# Patient Record
Sex: FEMALE | Race: WHITE | NOT HISPANIC OR LATINO | Employment: UNEMPLOYED
[De-identification: names, ages, dates, MRNs, and addresses within clinical notes are randomized per-mention and may not be internally consistent; named-entity substitution may affect disease eponyms.]

---

## 2017-07-29 ENCOUNTER — HEALTH MAINTENANCE LETTER (OUTPATIENT)
Age: 32
End: 2017-07-29

## 2022-05-28 ENCOUNTER — HOSPITAL ENCOUNTER (EMERGENCY)
Facility: CLINIC | Age: 37
Discharge: HOME | End: 2022-05-28
Attending: EMERGENCY MEDICINE | Admitting: EMERGENCY MEDICINE
Payer: COMMERCIAL

## 2022-05-28 ENCOUNTER — TELEPHONE (OUTPATIENT)
Dept: BEHAVIORAL HEALTH | Facility: CLINIC | Age: 37
End: 2022-05-28
Payer: COMMERCIAL

## 2022-05-28 VITALS
SYSTOLIC BLOOD PRESSURE: 138 MMHG | DIASTOLIC BLOOD PRESSURE: 100 MMHG | OXYGEN SATURATION: 100 % | TEMPERATURE: 97.7 F | RESPIRATION RATE: 16 BRPM | HEART RATE: 85 BPM

## 2022-05-28 DIAGNOSIS — Z72.89 SELF-INJURIOUS BEHAVIOR: ICD-10-CM

## 2022-05-28 DIAGNOSIS — S41.112A LACERATION OF LEFT UPPER ARM, INITIAL ENCOUNTER: ICD-10-CM

## 2022-05-28 PROCEDURE — 90791 PSYCH DIAGNOSTIC EVALUATION: CPT

## 2022-05-28 PROCEDURE — 12005 RPR S/N/A/GEN/TRK12.6-20.0CM: CPT

## 2022-05-28 PROCEDURE — 99285 EMERGENCY DEPT VISIT HI MDM: CPT | Mod: 25

## 2022-05-28 RX ORDER — LIDOCAINE HYDROCHLORIDE AND EPINEPHRINE 10; 10 MG/ML; UG/ML
INJECTION, SOLUTION INFILTRATION; PERINEURAL
Status: DISCONTINUED
Start: 2022-05-28 | End: 2022-05-28 | Stop reason: HOSPADM

## 2022-05-28 ASSESSMENT — ENCOUNTER SYMPTOMS
NUMBNESS: 0
WOUND: 1

## 2022-05-28 NOTE — CONSULTS
5/28/2022  Ryanne Hill 1985     [facility] Crisis Assessment    Patient was assessed: remote  Patient location: ED[address]  Was a release of information signed: Yes. Providers included on the release: referred to and [facility]    Referral Data and Chief Complaint  Ryanne is a 37 year old who uses she/her pronouns. Patient presented to the ED alone and was referred to the ED by self. Patient is presenting to the ED for the following concerns: self harm via cutting.      Informed Consent and Assessment Methods    Patient is her own guardian. Writer met with patient and explained the crisis assessment process, including applicable information disclosures and limits to confidentiality, assessed understanding of the process, and obtained consent to proceed with the assessment. Patient was observed to be able to participate in the assessment as evidenced by responding to questions in a coherent manner and indicating treatment  preferences. Assessment methods included conducting a formal interview with patient, review of medical records, collaboration with medical staff, and obtaining relevant collateral information from family and community providers when available.    Narrative Summary of Presenting Problem and Current Functioning  What led to the patient presenting for crisis services, factors that make the crisis life threatening or complex, stressors, how is this disrupting the patient's life, and how current functioning is in comparison to baseline. How is patient presenting during the assessment.     Pt is open, cooperative. States at beginning after I introduce self and purpose that she would prefer to not be hospitalized. Wants resources though.  Came in for cuts on arm - self harm. Reports to me with some self-deprecation - she is a , she started dating a , and stepped down to a lower position about a week ago so it was ok at work. Then, the camilla broke up with her last night.   She is mad at  "herself for putting a camilla above her self. She loved her job. She has been struggling with mental health for a while. Denies suicidal plan or intent - reason for living is her kids. Her plan right now - \"just keep on going\". Go to work, be as professional as possible.        History of the Crisis  Duration of the current crisis, coping skills attempted to reduce the crisis, community resources used, and past presentations.    Hx of schizoaffective d/o bipolar type.  Hospitalization 13/14 years ago.   Suicide attempt >15 years ago \"I put a dagger in my side\"    Therapy and meds in past but not for >5 years. Was on paxil, seroquel, lithium, ambien, - some of it helped/some didn't  Moved from [place] to [place] two years ago, and saw a med provider through [facility] once two months ago - but he didn't really listen. Has been working 80 hours and can't get in again soon - dropped the ball she says.    Lives with sister. Kids live with her exhusband, they  a couple of years ago and apparently he had the finances and therefore the kids. Still in contact with them though.   Hx of alcohol concerns. Denies recent    Collateral Information  Limited info available.    Risk Assessment    Risk of Harm to Self     ESS-6  1.a. Over the past 2 weeks, have you had thoughts of killing yourself? Yes  1.b. Have you ever attempted to kill yourself and, if yes, when did this last happen? Yes >15 years ago   2. Recent or current suicide plan? No   3. Recent or current intent to act on ideation? No  4. Lifetime psychiatric hospitalization? Yes  5. Pattern of excessive substance use? Yes  6. Current irritability, agitation, or aggression? No  Scoring note: BOTH 1a and 1b must be yes for it to score 1 point, if both are not yes it is zero. All others are 1 point per number. If all questions 1a/1b - 6 are no, risk is negligible. If one of 1a/1b is yes, then risk is mild. If either question 2 or 3, but not both, is yes, then risk is " automatically moderate regardless of total score. If both 2 and 3 are yes, risk is automatically high regardless of total score.     Score: 3, moderate risk    The patient has the following risk factors for suicide: substance abuse, poor impulse control, prior suicide attempt, recent loss and family disruption    Is the patient experiencing current suicidal ideation: No    Is the patient engaging in preparatory suicide behaviors (formulating how to act on plan, giving away possessions, saying goodbye, displaying dramatic behavior changes, etc)? No    Does the patient have access to firearms or other lethal means? no    The patient has the following protective factors: social support, expresses desire to engage in treatment, sense of obligation to people/pets and fulfilling employment    Support system information: sister is her only person in the world. Also reports her boss/work is aware and supportive re: her mental health    Patient strengths: Just keeps going. Will push through this    Does the patient engage in non-suicidal self-injurious behavior (NSSI/SIB)? yes. Method:cutting Frequency:last was several mos ago Duration:last night- was over the course of an argument History: since early adulthood    Is the patient vulnerable to sexual exploitation?  No    Is the patient experiencing abuse or neglect? no    Is the patient a vulnerable adult? No      Risk of Harm to Others  The patient has the following risk factors of harm to others: no risk factors identified    Does the patient have thoughts of harming others? No    Is the patient engaging in sexually inappropriate behavior?  no       Current Substance Abuse    Is there recent substance abuse? endorsed alcohol use last night. Did not specify amount    Was a urine drug screen or blood alcohol level obtained: No    CAGE AID  Reported she was in court ordered alcohol tx in 2016 - nothing thereafter. Denies substance use now. Denies concerns with alcohol  use      Current Symptoms/Concerns    Symptoms  Attention, hyperactivity, and impulsivity symptoms present: No    Anxiety symptoms present: No      Appetite symptoms present: No     Behavioral difficulties present: No     Cognitive impairment symptoms present: No    Depressive symptoms present: Yes Depressed mood, Feelings of helplessness , Feelings of hopelessness , Feelings of worthlessness , Impaired decision making , Low self esteem  and Thoughts of suicide/death      Eating disorder symptoms present: No    Learning disabilities, cognitive challenges, and/or developmental disorder symptoms present: No     Manic/hypomanic symptoms present: No    Personality and interpersonal functioning difficulties present : Yes: Emotional Deregulation and Impaired Interpersonal Functioning    Psychosis symptoms present: No      Sleep difficulties present: Yes: Difficulty falling asleep  and Difficulty staying sleep     Substance abuse disorder symptoms present: No     Trauma and stressor related symptoms present: No       Mental Status Exam   Affect: Appropriate   Appearance: Appropriate    Attention Span/Concentration: Attentive?    Eye Contact: Variable   Fund of Knowledge: Appropriate    Language /Speech Content: Fluent   Language /Speech Volume: Normal    Language /Speech Rate/Productions: Normal    Recent Memory: Intact   Remote Memory: Intact   Mood: Sad    Orientation to Person: Yes    Orientation to Place: Yes   Orientation to Time of Day: Yes    Orientation to Date: Yes    Situation (Do they understand why they are here?): Yes    Psychomotor Behavior: Normal    Thought Content: Clear   Thought Form: Intact       Mental Health and Substance Abuse History    History  Current and historical diagnoses or mental health concerns: schizoaffective bipolar type    Prior MH services (inpatient, programmatic care, outpatient, etc) : Yes hospitalization a long time ago, occasional therapy. no other LOC       Has the patient used  [facility] crisis team services before?: No    History of substance abuse: Yes alcohol    Prior CARLOS services (inpatient, programmatic care, detox, outpatient, etc) : Yes court ordered 2016    History of commitment: No    Family history of MH/CARLOS: No    Trauma history: No    Medication  Psychotropic medications: No current medications but a history of meds as noted in narrative.    Current Care Team  Primary Care Provider: No - would see [facility]    Psychiatrist: No    Therapist: No    : No    CTSS or ARMHS: No    ACT Team: No    Other: No    Biopsychosocial Information    Socioeconomic Information  Current living situation: sister and sister's son    Employment/income source: working 80 hours as  and     Relevant legal issues: denies    Cultural, Amish, or spiritual influences on mental health care: does not endorse    Is the patient active in the  or a : No      Relevant Medical Concerns   Patient identifies concerns with completing ADLs? No     Patient can ambulate independently? Yes     Other medical concerns? No     History of concussion or TBI? No        Diagnosis    295.70  (F25) Schizoaffective Disorder Bipolar Type - by history           Therapeutic Intervention  The following therapeutic methodologies were employed when working with the patient: establishing rapport, active listening, solution focused brief therapy, safety planning, motivational interviewing, trauma informed care and treatment planning. Patient response to intervention: rates it as supportive.      Disposition  Recommended disposition: Programmatic Care: day tx or iop given sx reported      Reviewed case and recommendations with attending provider. Attending Name: Juvenal      Attending concurs with disposition: Yes      Patient concurs with disposition: No: is interested in med management and therapy but has to keep working a lot - so we talk about OP resources      Guardian concurs  "with disposition: NA     Final disposition: Individual therapy  and Medication management.         Clinical Substantiation of Recommendations   Rationale with supporting factors for disposition and diagnosis.     Pt came to ED herself due to cutting in context of relationship breakup and job change. It certainly sounds stressful. This is occurring amongst a backdrop of poor mental health - hx, remotely, of suicide attempt, and more recently- cutting, low mood, \"not taking care of self\". Limited engagement in MH services due to barriers and work. Pt is able to safety plan and wants to work tonight. Denies SI. Reports some support system, and kids are important to her. She is referred to the level of care she feels she can engage in at this time - outpatient therapy and med management. Higher LOC is still encouraged if sx persist but she would like to start there. Something>nothing in absence of acute imminent risk to self/others, which she denies.        Assessment Details  Patient interview started at: 8:50a and completed at: 9:20a.    Total duration spent on the patient case in minutes: 1.0 hrs     CPT code(s) utilized: 96261 - Psychotherapy for Crisis - 60 (30-74*) min       Aftercare and Safety Planning  Follow up plans with MH/CARLOS services: Yes see below      Aftercare plan placed in the AVS and provided to patient: Yes. Given to patient by ED staff    ROQUE Hernandez      Aftercare Plan  Thank you for your time and bravery today. You met with Vicki, and below are notes of the follow up items you two discussed:    Follow up Appointments for Mental Health:  As discussed with Vicki, you have been referred to the [facility] Transition [facility]. Our staff will contact you to schedule. This outpatient service will provide short-term, VIRTUAL sessions until you begin scheduled services with your long-term provider(s). If you need to contact the Transition Clinic, please call [phone].    Longer-term " therapy appointment:  Thursday 6/16/2022 9:00 AM For Telehealth  Johnathan Harmon [facility], [facility]  [phone]  Please complete paperwork at least 48 hours before your appointment - available on website  [email or web address]       Longer-term medication management provider appointment:  6/29/2022 9:00 AM Telehealth  [facility]  [address]  [place] [address]  [phone]   Note from provider: You will be contacted by our office to set up the virtual meeting. If you have questions, please contact our office    If I am feeling unsafe or I am in a crisis, I will:   Contact my established care providers   Call the  [facility] Crisis  [phone]  Go to the nearest emergency room   Call 111     My plan today: go to work and be as professional as possible. My team supports me there. I will be kind to myself and try to not put too much pressure on myself today - I've had a really hard day and  Need rest and recovery like anyone else.   My sister supports me. I am important to my kids. I will keep pushing on this - I am worth taking care of.    Crisis Lines  Crisis Text Line  Text [phone]  You will be connected with a trained live crisis counselor to provide support.      Additional Information  Today you were seen by a licensed mental health professional through Triage and Transition services, Behavioral Healthcare Providers ([facility])  for a crisis assessment in the Emergency Department at [facility].  It is recommended that you follow up with your established providers (psychiatrist, mental health therapist, and/or primary care doctor - as relevant) as soon as possible. Coordinators from [facility] will be calling you in the next 24-48 hours to ensure that you have the resources you need.  You can also contact [facility] coordinators directly at [phone]. You may have been scheduled for or offered an appointment with a mental health provider.  [facility] maintains an extensive network of licensed behavioral health providers to connect patients with the services they need.  We do not charge providers a fee to participate in our referral network.  We match patients with providers based on a patient's specific needs, insurance coverage, and location.  Our first effort will be to refer you to a provider within your care system, and will utilize providers outside your care system as needed.

## 2022-05-28 NOTE — DISCHARGE INSTRUCTIONS
Aftercare Plan  Thank you for your time and bravery today. You met with Vicki, and below are notes of the follow up items you two discussed:    Follow up Appointments for Mental Health:  As discussed with Vicki, you have been referred to the [facility] Transition [facility]. Our staff will contact you to schedule. This outpatient service will provide short-term, VIRTUAL sessions until you begin scheduled services with your long-term provider(s). If you need to contact the Transition Clinic, please call [phone].    Longer-term therapy appointment:  Thursday 6/16/2022 9:00 AM For Telehealth  Johnathan Harmon [facility], [facility]  [phone]  Please complete paperwork at least 48 hours before your appointment - available on website  [email or web address]       Longer-term medication management provider appointment:  6/29/2022 9:00 AM Telehealth  [facility]  [address] [place] [address]  [phone]   Note from provider: You will be contacted by our office to set up the virtual meeting. If you have questions, please contact our office    If I am feeling unsafe or I am in a crisis, I will:   Contact my established care providers   Call the  [facility] Crisis  [phone]  Go to the nearest emergency room   Call [phone]     My plan today: go to work and be as professional as possible. My team supports me there. I will be kind to myself and try to not put too much pressure on myself today - I've had a really hard day and  Need rest and recovery like anyone else.   My sister supports me. I am important to my kids. I will keep pushing on this - I am worth taking care of.    Crisis Lines  Crisis Text Line  Text [phone]  You will be connected with a trained live crisis counselor to provide support.      Additional Information  Today you were seen by a licensed mental health professional through Triage and Transition services, Behavioral  Healthcare Providers ([facility])  for a crisis assessment in the Emergency Department at [facility].  It is recommended that you follow up with your established providers (psychiatrist, mental health therapist, and/or primary care doctor - as relevant) as soon as possible. Coordinators from [facility] will be calling you in the next 24-48 hours to ensure that you have the resources you need.  You can also contact [facility] coordinators directly at [phone]. You may have been scheduled for or offered an appointment with a mental health provider. [facility] maintains an extensive network of licensed behavioral health providers to connect patients with the services they need.  We do not charge providers a fee to participate in our referral network.  We match patients with providers based on a patient's specific needs, insurance coverage, and location.  Our first effort will be to refer you to a provider within your care system, and will utilize providers outside your care system as needed.        Discharge Instructions  Laceration (Cut)    You were seen today for a laceration (cut).  Your provider examined your laceration for any problems such a buried foreign body (like glass, a splinter, or gravel), or injury to blood vessels, tendons, and nerves.  Your provider may have also rinsed and/or scrubbed your laceration to help prevent an infection. It may not be possible to find all problems with your laceration on the first visit; occasionally foreign bodies or a tendon injury can go undetected.    Your laceration may have been closed in one of several ways:  No closure: many wounds will heal just fine without closure.  Stitches: regular stitches that require removal.  Staples: skin staples are often used in the scalp/head.  Wound adhesive (glue): skin glue can be used for certain lacerations and doesn t require removal.  Wound strips (aka Butterfly bandages or steri-strips): these are bandages that help to close a wound.  Absorbable  stitches:  dissolving  stitches that go away on their own and usually don t require removal.    A small percentage of wounds will develop an infection regardless of how well the wound is cared for. Antibiotics are generally not indicated to prevent an infection so are only given for a small number of high-risk wounds. Some lacerations are too high risk to close, and are left open to heal because closure can increase the likelihood that an infection will develop.    Remember that all lacerations, no matter how expertly repaired, will cause scarring. We consider many factors, techniques, and materials, in our efforts to provide the best possible cosmetic outcome.    Generally, every Emergency Department visit should have a follow-up clinic visit with either a primary or a specialty clinic/provider. Please follow-up as instructed by your emergency provider today.     Return to the Emergency Department right away if:  You have more redness, swelling, pain, drainage (pus), a bad smell, or red streaking from your laceration as these symptoms could indicate an infection.  You have a fever of 100.4 F or more.  You have bleeding that you cannot stop at home. If your cut starts to bleed, hold pressure on the bleeding area with a clean cloth or put pressure over the bandage.  If the bleeding does not stop after using constant pressure for 30 minutes, you should return to the Emergency Department for further treatment.  An area past the laceration is cool, pale, or blue compared with the other side, or has a slower return of color when squeezed.  Your dressing seems too tight or starts to get uncomfortable or painful. For children, signs of a problem might be irritability or restlessness.  You have loss of normal function or use of an area, such as being unable to straighten or bend a finger normally.  You have a numb area past the laceration.    Return to the Emergency Department or see your regular provider if:  The  laceration starts to come open.   You have something coming out of the cut or a feeling that there is something in the laceration.  Your wound will not heal, or keeps breaking open. There can always be glass, wood, dirt or other things in any wound.  They will not always show up, even on x-rays.  If a wound does not heal, this may be why, and it is important to follow-up with your regular provider.    Home Care:  Take your dressing off in 12-24 hours, or as instructed by your provider, to check your laceration. Remove the dressing sooner if it seems too tight or painful, or if it is getting numb, tingly, or pale past the dressing.  Gently wash your laceration 1-2 times daily with clean water and mild soap. It is okay to shower or run clean water over the laceration, but do not let the laceration soak in water (no swimming).  If your laceration was closed with wound adhesive or strips: pat it dry and leave it open to the air. For all other repairs: after you wash your laceration, or at least 2 times a day, apply antibiotic ointment (such as Neosporin  or Bacitracin ) to the laceration, then cover it with a Band-Aid  or gauze.  Keep the laceration clean. Wear gloves or other protective clothing if you are around dirt.    Follow-up for removal:  If your wound was closed with staples or regular stitches, they need to be removed according to the instructions and timeline specified by your provider today.  If your wound was closed with absorbable ( dissolving ) sutures, they should fall out, dissolve, or not be visible in about one week. If they are still visible, then they should be removed according to the instructions and timeline specified by your provider today.    Scars:  To help minimize scarring:  Wear sunscreen over the healed laceration when out in the sun.  Massage the area regularly once healed.  You may apply Vitamin E to the healed wound.  Wait. Scars improve in appearance over months and years.    If you  were given a prescription for medicine here today, be sure to read all of the information (including the package insert) that comes with your prescription.  This will include important information about the medicine, its side effects, and any warnings that you need to know about.  The pharmacist who fills the prescription can provide more information and answer questions you may have about the medicine.  If you have questions or concerns that the pharmacist cannot address, please call or return to the Emergency Department.       Remember that you can always come back to the Emergency Department if you are not able to see your regular provider in the amount of time listed above, if you get any new symptoms, or if there is anything that worries you.

## 2022-05-28 NOTE — LETTER
May 28, 2022      To Whom It May Concern:      Ryanne Hill was seen in our Emergency Department today, 05/28/22.  I expect her condition to improve over the next 1-2 days.  She may return to work when improved.    Sincerely,        Enedelia CRANE RN

## 2022-05-28 NOTE — ED NOTES
Patient forgot phone  after discharge.  RN contacted and informed patient.   placed in patient belonging bag in patient locker #1 in Room 5.

## 2022-05-28 NOTE — ED NOTES
Patient searched by RN and security.  Belongings secured in DEC office.  ERNA explained to patient and written rights given to patient.

## 2022-05-28 NOTE — ED PROVIDER NOTES
History   Chief Complaint:  Laceration     The history is provided by the patient.      Ryanne Hill is a 37 year old female with history of bipolar disorder and self harm who presents with left arm laceration. The patient reports lacerating the elbow region of her left arm with a clean kitchen knife at approximately 2:30am. At the time of injury, she states she was under the influence of alcohol and was extremely angry, but denies any suicidal thoughts. Her children are very important to her and a reason for her to live. She notes that, in the past, she has found relief from stress through the form of cutting. She currently does not have a psychiatric provider, but has had one in the past. She is not on any psychiatric medications, but would like to be. Her last tetanus shot was about 5 years ago. She denies any numbness on her left arm.     Review of Systems   Skin: Positive for wound.   Neurological: Negative for numbness.   Psychiatric/Behavioral: Positive for self-injury. Negative for suicidal ideas.   All other systems reviewed and are negative.    Allergies:  Compazine    Medications:  Paxil    Past Medical History:     Asthma  Bipolar disorder  Pulmonary embolus    Past Surgical History:    D&C    Social History:  Presents alone  Presents via private vehicle    Physical Exam     Patient Vitals for the past 24 hrs:   BP Temp Temp src Pulse Resp SpO2   05/28/22 1007 (!) 138/100 -- -- 85 16 100 %   05/28/22 0814 (!) 139/90 -- -- 81 16 100 %   05/28/22 0624 (!) 158/111 97.7  F (36.5  C) Temporal 100 16 98 %       Physical Exam  VS: Reviewed per above  HENT: normal speech  EYES: sclera anicteric  CV: Rate as noted, regular rhythm.   RESP: Effort normal. Breath sounds are normal bilaterally.  NEURO: Alert, moving all extremities, intact strength and sensation light touch in left upper extremity.  MSK: No deformity of the extremities, 3 horizontally oriented lacerations about the left antecubital fossa measuring  3.5 cm, 4.5 cm, 7.5 cm.  Intact FDP/FDS/extensor tendon function in the left upper extremity distally.  Intact left radial pulse.  SKIN: Warm and dry        Emergency Department Course     Procedures    Laceration Repair #1     Procedure: Laceration Repair    Indication: Laceration    Consent: Verbal    Location: Left Forearm     Length: 3.5 cm    Preparation: Irrigation with Sterile Saline.    Anesthesia: Lidocaine with Epinephrine - 1%   Anxiolysis: None  Sedation: No sedation.      Treatment/Exploration: Wound explored, no foreign bodies found     Closure: The wound was closed with one layer. Skin/superficial layer was closed with 5 x 3-0 Nylon using Interrupted sutures.     Patient Status: The patient tolerated the procedure well: Yes. There were no complications.      Laceration Repair #2     Procedure: Laceration Repair    Indication: Laceration    Consent: Verbal    Location: Left Forearm     Length: 4.5 cm    Preparation: Irrigation with Sterile Saline.    Anesthesia: Lidocaine with Epinephrine - 1%   Anxiolysis: None  Sedation: No sedation.      Treatment/Exploration: Wound explored, no foreign bodies found     Closure: The wound was closed with one layer. Skin/superficial layer was closed with 7 x 3-0 Nylon using Interrupted sutures.     Patient Status: The patient tolerated the procedure well: Yes. There were no complications.      Laceration Repair #3     Procedure: Laceration Repair    Indication: Laceration    Consent: Verbal    Location: Left Forearm     Length: 7.5 cm    Preparation: Irrigation with Sterile Saline.    Anesthesia: Lidocaine with Epinephrine - 1%   Anxiolysis: None  Sedation: No sedation.      Treatment/Exploration: Wound explored, no foreign bodies found     Closure: The wound was closed with one layer. Skin/superficial layer was closed with 11 x 3-0 Nylon using Interrupted sutures.     Patient Status: The patient tolerated the procedure well: Yes. There were no  complications.    Emergency Department Course:           Reviewed:  I reviewed nursing notes, vitals, past medical history and Care Everywhere    Assessments:  0725 I obtained history and examined the patient as noted above.   0744    I numbed the patient's laceration.  0800    Repaired lac as above.  0958 I rechecked the patient and explained findings. Can discharge.    Consults:  0949 I spoke to DEC regarding this patient and their evaluation.    Disposition:  The patient was discharged to home.     Impression & Plan   Medical Decision Making:  Patient presents to the ER for evaluation of self-inflicted left antecubital fossa region lacerations.  On arrival vital signs are reassuring.  On exam she has lacerations extending through the adipose tissue but not involving deeper vascular or tendon or nerve structures on my exam.  No foreign bodies identified.  Lacerations cleansed and irrigated copiously.  They were repaired per procedure note above.  Patient assures me her last tetanus was within 5 years.  Patient mentions her motivation for this was stress release rather than suicide intent.  She cites multiple reasons why she would like to live.  DEC evaluated the patient and patient was able to contract for safety.  DEC was able to arrange for outpatient mental health follow-up.  Plan for suture removal in 2 weeks.  Return precautions discussed prior to discharge.    Diagnosis:    ICD-10-CM    1. Self-injurious behavior  Z72.89    2. Laceration of left upper arm, initial encounter  S41.112A      Scribe Disclosure:  RAMAKRISHNA, Renny Cruz & Fátima Santillan, am serving as a scribe at 7:11 AM on 5/28/2022 to document services personally performed by London Del Rosario MD based on my observations and the provider's statements to me.            London Del Rosario MD  05/28/22 1271
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
## 2022-05-28 NOTE — TELEPHONE ENCOUNTER
Writer reached Pt. Patient was scheduled with TC for 6.2 at 10am with Marsha. Referral notified, added in tracker.  Routed to TC RN pool.    Writer sent mychart activation text. Please check to see if this was activated and then send intake documentation.      Charley Bustos  Care Coordinator  5.28  ----- Message from ROQUE Hernandez sent at 5/28/2022  9:49 AM CDT -----  Regarding: Pt referral  Transition Clinic Referral   [place] Only   Limited [place] Availability    Type of Referral:      _____Therapy  ___X_Therapy & Medication (Psychiatry next level of care appointment needs to be scheduled)  _____Medication Only (Psychiatry next level of care appointment needs to be scheduled)  _____Diagnostic Assessment Only      Referring Provider Name: ROQUE Hernandez    Clinician completing the assessment. Vicki Alvarez    Referring Provider: TRIAGE & TRANSITION ([facility]) CLINICIAN     If known, referring provider contact name: Vicki; Phone Number: [phone]  Service Line/Location: [facility]    Reason for Transition Clinic Referral: bridge gap in care    Next Level of Care Patient Will Be Transitioned To: outpatient services    Start Date for Next Level of Care Therapy (Required): Thursday 6/16/2022 9:00 AM For Telehealth  Johnathan Harmon [facility], [facility]  [phone]      Start Date for Next Level of Care Medication (Required): 6/29/2022 9:00 AM Telehealth  Amadeo Martinez  [facility]  [address] [place] [address]  [phone]      Psychiatry cannot see patients who do not have active medical insurance    What Would Be Helpful from the Transition Clinic: relatonship ending and managing that/work stress are current concern. Hx of mental health and alcohol concerns.      Needs: NO    Does Patient Have Access to Technology: yes    Patient E-mail Address: [email or web address]    Current Patient Phone Number: [phone];      Clinician Gender Preference (if applicable): YES: female    Pt is off of work on Wednesday and Thursdays so ideally will do visit then, she says.    Vicki Alvarez, MARIAHSW

## 2022-05-31 ENCOUNTER — TELEPHONE (OUTPATIENT)
Dept: BEHAVIORAL HEALTH | Facility: CLINIC | Age: 37
End: 2022-05-31

## 2022-05-31 NOTE — TELEPHONE ENCOUNTER
Mental Health &Addiction (MH&A)Transition Clinic (TC):     Provides Patient Support While Waiting to Access Programmatic and Outpatient MH&A Care and Provides Select Crisis Assessment Services     NURSING Referral Review  _________________________________________    This RN has reviewed this Medication Management referral to the Transition Clinic and deemed the referral   [x] Appropriate  [] Inappropriate   []Consulting     Based on the following criteria:     Pt has a psychiatric provider (or pending plan) in place for future prescribing: Yes:  6/29/2022 9:00 AM Telehealth  [facility]  [address]  [place], [place] [address] [phone]      Timeframe until pt's scheduled psychiatry appointment is less than 6 months: Yes:  Approximately 1 month.      Pt takes psychiatric medications: Yes: None listed.      Pt's goals seem to align with this temporary service: Yes: TC to bridge Psychiatric care and psychiatric medication management until next LOC.       Any additional pertinent information regarding this referral: Hx of Bipolar illness.  Recent ED visit 5/28/22 with SIB.  Cut left arm.  Etoh.      Initial contact w/ patient/parent: Wtr made 1st attempt to contact this patient to schedule appointment with the TC.  There was no answer.  Voice message left for this patient. Awaiting call back.      The Transition Clinic phone # is [phone].    Roel St RN on May 31, 2022 at 8:23 AM  Writer reached Pt. Patient was scheduled with TC for 6.2 at 10am with Marsha. Referral notified, added in tracker.  Routed to TC RN pool.     Writer sent [employer] activation text. Please check to see if this was activated and then send intake documentation.        Charley Bustos  Care Coordinator  5.28  ----- Message from ROQUE Hernandez sent at 5/28/2022  9:49 AM CDT -----  Regarding: Pt referral  Transition Clinic Referral   [place] Only   Limited [place] Availability     Type  of Referral:        _____Therapy  ___X_Therapy & Medication (Psychiatry next level of care appointment needs to be scheduled)  _____Medication Only (Psychiatry next level of care appointment needs to be scheduled)  _____Diagnostic Assessment Only        Referring Provider Name: ROQUE Hernandez     Clinician completing the assessment. Vicki Alvarez     Referring Provider: TRIAGE & TRANSITION ([facility]) CLINICIAN      If known, referring provider contact name: Vicki; Phone Number: [phone]  Service Line/Location: [facility]     Reason for Transition Clinic Referral: bridge gap in care     Next Level of Care Patient Will Be Transitioned To: outpatient services     Start Date for Next Level of Care Therapy (Required): Thursday 6/16/2022 9:00 AM For Telehealth  Johnathan Harmon [facility], [facility]  [phone]        Start Date for Next Level of Care Medication (Required): 6/29/2022 9:00 AM Telehealth  [facility]  [address] [place] [address] [phone]       Psychiatry cannot see patients who do not have active medical insurance     What Would Be Helpful from the Transition Clinic: relatonship ending and managing that/work stress are current concern. Hx of mental health and alcohol concerns.       Needs: NO     Does Patient Have Access to Technology: yes     Patient E-mail Address: [email or web address]     Current Patient Phone Number: [phone];      Clinician Gender Preference (if applicable): YES: female     Pt is off of work on Wednesday and Thursdays so ideally will do visit then, she says.     ROQUE Hernandez

## 2022-06-01 ENCOUNTER — TELEPHONE (OUTPATIENT)
Dept: BEHAVIORAL HEALTH | Facility: CLINIC | Age: 37
End: 2022-06-01
Payer: COMMERCIAL

## 2022-06-01 NOTE — TELEPHONE ENCOUNTER
Mental Health &Addiction (MH&A)Transition Clinic (TC):     Provides Patient Support While Waiting to Access Programmatic and Outpatient MH&A Care and Provides Select Crisis Assessment Services     NURSING Referral Review  _________________________________________    This RN has reviewed this Medication Management referral to the Transition Clinic and deemed the referral   [x] Appropriate  [] Inappropriate   []Consulting     Based on the following criteria:    Pt has a psychiatric provider (or pending plan) in place for future prescribing: Yes:   6/29/2022 9:00 AM Telehealth  Amadeo Martinez,  [facility]  [address]  [place], [place] [address] [phone]       Timeframe until pt's scheduled psychiatry appointment is less than 6 months: Yes: ~1 month.       Pt takes psychiatric medications: Yes: None on file.       Pt's goals seem to align with this temporary service: Yes: TC to bridge psychiatric care and psychiatric medications until next LOC 6/29/22      Any additional pertinent information regarding this referral: Bipolar and SIB.  ETOH.  Hx of PE.      Initial contact w/ patient/parent: This RN made 1st attempt to contact this patient to schedule appointment with the TC.  There was no answer.  A voice message was left for this patient.  Awaiting return call.      The Transition Clinic phone # is [phone].    Roel St, RN on June 1, 2022 at 9:28 AM      Writer reached Pt. Patient was scheduled with TC for 6.2 at 10am with Marsha. Referral notified, added in tracker.  Routed to TC RN pool.     Writer sent mychart activation text. Please check to see if this was activated and then send intake documentation.        Charley Bustos  Care Coordinator  5.28  ----- Message from ROQUE Hernandez sent at 5/28/2022  9:49 AM CDT -----  Regarding: Pt referral  Transition Clinic Referral   [place] Only   Limited [place] Availability     Type of  Referral:        _____Therapy  ___X_Therapy & Medication (Psychiatry next level of care appointment needs to be scheduled)  _____Medication Only (Psychiatry next level of care appointment needs to be scheduled)  _____Diagnostic Assessment Only        Referring Provider Name: ROQUE Hernandez     Clinician completing the assessment. Vicki Alvarez     Referring Provider: TRIAGE & TRANSITION ([facility]) CLINICIAN      If known, referring provider contact name: Vicki; Phone Number: [phone]  Service Line/Location: [facility]     Reason for Transition Clinic Referral: bridge gap in care     Next Level of Care Patient Will Be Transitioned To: outpatient services     Start Date for Next Level of Care Therapy (Required): Thursday 6/16/2022 9:00 AM For Telehealth  Johnathan Harmon [facility], [facility]  [phone]        Start Date for Next Level of Care Medication (Required): 6/29/2022 9:00 AM Telehealth  [facility]  [address] [facility] [place] [address] [phone]       Psychiatry cannot see patients who do not have active medical insurance     What Would Be Helpful from the Transition Clinic: relatonship ending and managing that/work stress are current concern. Hx of mental health and alcohol concerns.       Needs: NO     Does Patient Have Access to Technology: yes     Patient E-mail Address: [email or web address]     Current Patient Phone Number: [phone];      Clinician Gender Preference (if applicable): YES: female     Pt is off of work on Wednesday and Thursdays so ideally will do visit then, she says.     ROQUE Hernandez

## 2022-06-02 ENCOUNTER — VIRTUAL VISIT (OUTPATIENT)
Dept: BEHAVIORAL HEALTH | Facility: CLINIC | Age: 37
End: 2022-06-02
Payer: COMMERCIAL

## 2022-06-02 DIAGNOSIS — F31.60 BIPOLAR I DISORDER, MOST RECENT EPISODE (OR CURRENT) MIXED (H): Primary | ICD-10-CM

## 2022-06-02 PROCEDURE — 90837 PSYTX W PT 60 MINUTES: CPT | Mod: GT | Performed by: SOCIAL WORKER

## 2022-06-02 ASSESSMENT — ANXIETY QUESTIONNAIRES
7. FEELING AFRAID AS IF SOMETHING AWFUL MIGHT HAPPEN: NEARLY EVERY DAY
GAD7 TOTAL SCORE: 21
6. BECOMING EASILY ANNOYED OR IRRITABLE: NEARLY EVERY DAY
3. WORRYING TOO MUCH ABOUT DIFFERENT THINGS: NEARLY EVERY DAY
2. NOT BEING ABLE TO STOP OR CONTROL WORRYING: NEARLY EVERY DAY
4. TROUBLE RELAXING: NEARLY EVERY DAY
5. BEING SO RESTLESS THAT IT IS HARD TO SIT STILL: NEARLY EVERY DAY
GAD7 TOTAL SCORE: 21
GAD7 TOTAL SCORE: 21
1. FEELING NERVOUS, ANXIOUS, OR ON EDGE: NEARLY EVERY DAY
8. IF YOU CHECKED OFF ANY PROBLEMS, HOW DIFFICULT HAVE THESE MADE IT FOR YOU TO DO YOUR WORK, TAKE CARE OF THINGS AT HOME, OR GET ALONG WITH OTHER PEOPLE?: VERY DIFFICULT
7. FEELING AFRAID AS IF SOMETHING AWFUL MIGHT HAPPEN: NEARLY EVERY DAY

## 2022-06-02 ASSESSMENT — PATIENT HEALTH QUESTIONNAIRE - PHQ9
SUM OF ALL RESPONSES TO PHQ QUESTIONS 1-9: 23
SUM OF ALL RESPONSES TO PHQ QUESTIONS 1-9: 23
10. IF YOU CHECKED OFF ANY PROBLEMS, HOW DIFFICULT HAVE THESE PROBLEMS MADE IT FOR YOU TO DO YOUR WORK, TAKE CARE OF THINGS AT HOME, OR GET ALONG WITH OTHER PEOPLE: VERY DIFFICULT
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
## 2022-06-02 NOTE — TELEPHONE ENCOUNTER
This RN made a 2nd attempt to contact this patient to schedule an appointment with the Transition Clinic.  There was no answer.  RN left a message for patient to call TC at [phone].  Awaiting call back.

## 2022-06-06 NOTE — TELEPHONE ENCOUNTER
TC RN made 3rd attempt to contact this patient to schedule an appointment with Indiana Cantor.  There was no answer.  A voice message was left for this patient to contact the TC to schedule and appointment with Indiana Cantor at [phone].  Awaiting call back.

## 2022-06-07 NOTE — TELEPHONE ENCOUNTER
TC RN made 4th attempt to call this patient to schedule an appointment with Indiana Cantor.  This RN has left multiple prior messages both on the phone and [employer].  This patient has not returned any communication.  This referral will be considered closed.   If the patient calls at a future date, this RN will discuss reopening this referral with the Provider.

## 2022-06-09 ENCOUNTER — VIRTUAL VISIT (OUTPATIENT)
Dept: BEHAVIORAL HEALTH | Facility: CLINIC | Age: 37
End: 2022-06-09
Payer: COMMERCIAL

## 2022-06-09 DIAGNOSIS — F31.60 BIPOLAR I DISORDER, MOST RECENT EPISODE (OR CURRENT) MIXED (H): Primary | ICD-10-CM

## 2022-06-09 PROCEDURE — 90834 PSYTX W PT 45 MINUTES: CPT | Mod: GT

## 2022-06-09 NOTE — PROGRESS NOTES
Mental Health and Addictions, Transition Care                                    Progress Note    Patient Name: Ryanne Hill  Date: 6/9/2022         Service Type: Individual      Session Start Time: 1303  Session End Time: 1352     Session Length: 51 min    Session #: 2    Attendees: Client attended alone    Service Modality:  Video Visit:      Provider verified identity through the following two step process.  Patient provided:  Patient photo    Telemedicine Visit: The patient's condition can be safely assessed and treated via synchronous audio and visual telemedicine encounter.      Reason for Telemedicine Visit: Patient has requested telehealth visit    Originating Site (Patient Location): Patient's home    Distant Site (Provider Location): Provider Remote Setting- Home Office    Consent:  The patient/guardian has verbally consented to: the potential risks and benefits of telemedicine (video visit) versus in person care; bill my insurance or make self-payment for services provided; and responsibility for payment of non-covered services.     Patient would like the video invitation sent by:  My Chart    Mode of Communication:  Video Conference via Amwell    As the provider I attest to compliance with applicable laws and regulations related to telemedicine.    DATA  Interactive Complexity: No  Crisis: No        Progress Since Last Session (Related to Symptoms / Goals / Homework):   Symptoms: No change     Homework: Achieved / completed to satisfaction      Episode of Care Goals: Achieved / completed to satisfaction - PREPARATION (Decided to change - considering how); Intervened by negotiating a change plan and determining options / strategies for behavior change, identifying triggers, exploring social supports, and working towards setting a date to begin behavior change     Current / Ongoing Stressors and Concerns:   Patient presented stating she is about the same. Today she is not experiencing anxiety.  She  states she needs to go into a clinic to have her stitches removed. Talked about the minute clinic, walk in clinic vs the Emergency Department.  Discussed effects of past trauma and decision to look at it, talk about it and process it. Patient is scheduled with Ana Esqueda at [facility] on June 16th and with Amadeo Martinez on June 29 at [facility].  Patient is concerned she will nee to start over.  Encouraged her to follow through knowing she has a therapy foundation to take with her.  Patient it aware and has insight into her low self esteem, her self blame for what happened to her as well as hardships her parents may have had. Patient is aware she has a choice to hang on to the past or begin to process and let go. Patient was told if she needs another referrals to call Intake to request this writer call her back.  Patient to discharge from Transition Clinic. Transfer to a long term therapist.      Treatment Objective(s) Addressed in This Session:   explore therapy options; gaols for therapy and recognizing mood instability.       Intervention:   Solution Focused: Expore benefits,challenges and difficulties in processing past trauma. Patient will begin to journal thoughts and emotions.    Assessments completed prior to visit:  The following assessments were completed by patient for this visit:  PHQ2: No flowsheet data found.  PHQ9:   PHQ-9 SCORE 6/2/2022   PHQ-9 Total Score MyChart 23 (Severe depression)   PHQ-9 Total Score 23     GAD7:   MERT-7 SCORE 6/2/2022   Total Score 21 (severe anxiety)   Total Score 21     CAGE-AID:   CAGE-AID Total Score 6/2/2022   Total Score 4   Total Score MyChart 4 (A total score of 2 or greater is considered clinically significant)     PROMIS 10-Global Health (only subscores and total score):   PROMIS-10 Scores Only 6/2/2022   Global Mental Health Score 8   Global Physical Health Score 13   PROMIS TOTAL - SUBSCORES 21     PROMIS Parent Proxy Scale V1.0 Global Health 7+2:  No questionnaires on file.      ASSESSMENT: Current Emotional / Mental Status (status of significant symptoms):  Risk status (Self / Other harm or suicidal ideation)   Patient denies current fears or concerns for personal safety.   Patient denies current or recent suicidal ideation or behaviors.   Patient denies current or recent homicidal ideation or behaviors.   Patient denies current or recent self injurious behavior or ideation.   Patient denies other safety concerns.   Patient reports there has been a change in risk factors since their last session.  patient has stitches where she last cut.  She is experience sever itching.  She reports fear of processing her past trauma and states she needs her medication, psychotropic medication to help her stay sober and to honestly look at the trauma.     Patient reports there has been a chance in protective factors since their last session.  patient significant other is aware of her cutting and will be a safe person for her to go to.   Recommended that patient call 911 or go to the local ED should there be a change in any of these risk factors.     Appearance:   Appropriate    Eye Contact:   Good    Psychomotor Behavior: Normal    Attitude:   Cooperative  Friendly Pleasant   Orientation:   Person Place Time Situation   Speech    Rate / Production: Normal/ Responsive Emotional Normal     Volume:  Normal    Mood:    Anxious  Depressed  Sad    Affect:    Appropriate  Tearful   Thought Content:  Clear    Thought Form:  Coherent  Goal Directed    Insight:    Good      Medication Review:   No changes to current psychiatric medication(s)     Medication Compliance:   Yes patient see's psychatric PA on June 29th and  looks forward to medication treatment she expects to help her manage depression and aniety.     Changes in Health Issues:   None reported     Chemical Use Review:   Substance Use: Chemical use reviewed, no active concerns identified      Tobacco Use: No change in  amount of tobacco use since last session.  Reviewed information and resources for quitting   Current Every Day Smoker Smoking Frequency   0.5 packs/day         Diagnosis:  1. Bipolar I disorder, most recent episode (or current) mixed (H)        Collateral Reports Completed:   Routed note to PCP    PLAN: (Patient Tasks / Therapist Tasks / Other)  Patient will begin to journal thoughts and emotions. She will follow up with Ana Esqueda at [facility] on June 16th and with Amadeo Briseno on June 29, 2022.  Patient has the contact info for this clinic should she need a different referral.  Patient is discharged from care.  No treatment plan was completed.      Jessica Nieto, [facility]  6/09/2022                                                      _____________________________________________________________________

## 2022-06-25 ENCOUNTER — HEALTH MAINTENANCE LETTER (OUTPATIENT)
Age: 37
End: 2022-06-25

## 2022-11-20 ENCOUNTER — HEALTH MAINTENANCE LETTER (OUTPATIENT)
Age: 37
End: 2022-11-20

## 2023-07-08 ENCOUNTER — HEALTH MAINTENANCE LETTER (OUTPATIENT)
Age: 38
End: 2023-07-08

## 2023-09-12 ENCOUNTER — HOSPITAL ENCOUNTER (EMERGENCY)
Facility: CLINIC | Age: 38
Discharge: LEFT BEFORE DISCHARGE | End: 2023-09-12

## 2023-09-12 VITALS
SYSTOLIC BLOOD PRESSURE: 124 MMHG | OXYGEN SATURATION: 98 % | BODY MASS INDEX: 25.76 KG/M2 | DIASTOLIC BLOOD PRESSURE: 98 MMHG | HEART RATE: 82 BPM | RESPIRATION RATE: 20 BRPM | WEIGHT: 170 LBS | TEMPERATURE: 97.5 F | HEIGHT: 68 IN

## 2023-09-12 NOTE — ED TRIAGE NOTES
Pt arrives ambulatory with fiance for right knee laceration. Occurred when she was pulling clothes out of the dryer and she slipped. Laceration noted to right knee to be actively bleeding. Wrapped in triage.

## 2024-02-03 ENCOUNTER — HEALTH MAINTENANCE LETTER (OUTPATIENT)
Age: 39
End: 2024-02-03

## 2024-08-25 ENCOUNTER — HEALTH MAINTENANCE LETTER (OUTPATIENT)
Age: 39
End: 2024-08-25

## 2025-02-12 ENCOUNTER — HOSPITAL ENCOUNTER (EMERGENCY)
Facility: CLINIC | Age: 40
Discharge: HOME | End: 2025-02-12
Attending: EMERGENCY MEDICINE | Admitting: EMERGENCY MEDICINE

## 2025-02-12 ENCOUNTER — APPOINTMENT (OUTPATIENT)
Dept: ULTRASOUND IMAGING | Facility: CLINIC | Age: 40
End: 2025-02-12
Attending: EMERGENCY MEDICINE
Payer: MEDICAID

## 2025-02-12 VITALS
HEIGHT: 68 IN | BODY MASS INDEX: 24.55 KG/M2 | DIASTOLIC BLOOD PRESSURE: 92 MMHG | OXYGEN SATURATION: 94 % | HEART RATE: 68 BPM | WEIGHT: 162 LBS | SYSTOLIC BLOOD PRESSURE: 128 MMHG | RESPIRATION RATE: 13 BRPM

## 2025-02-12 DIAGNOSIS — N93.8 DYSFUNCTIONAL UTERINE BLEEDING: ICD-10-CM

## 2025-02-12 LAB
ALBUMIN SERPL BCG-MCNC: 4 G/DL (ref 3.5–5.2)
ALP SERPL-CCNC: 94 U/L (ref 40–150)
ALT SERPL W P-5'-P-CCNC: 13 U/L (ref 0–50)
ANION GAP SERPL CALCULATED.3IONS-SCNC: 11 MMOL/L (ref 7–15)
AST SERPL W P-5'-P-CCNC: 24 U/L (ref 0–45)
BASOPHILS # BLD AUTO: 0.1 10E3/UL (ref 0–0.2)
BASOPHILS NFR BLD AUTO: 1 %
BILIRUB SERPL-MCNC: 0.3 MG/DL
BUN SERPL-MCNC: 6.7 MG/DL (ref 6–20)
CALCIUM SERPL-MCNC: 8.7 MG/DL (ref 8.8–10.4)
CHLORIDE SERPL-SCNC: 103 MMOL/L (ref 98–107)
CREAT SERPL-MCNC: 0.63 MG/DL (ref 0.51–0.95)
EGFRCR SERPLBLD CKD-EPI 2021: >90 ML/MIN/1.73M2
EOSINOPHIL # BLD AUTO: 0.1 10E3/UL (ref 0–0.7)
EOSINOPHIL NFR BLD AUTO: 1 %
ERYTHROCYTE [DISTWIDTH] IN BLOOD BY AUTOMATED COUNT: 14.3 % (ref 10–15)
GLUCOSE SERPL-MCNC: 84 MG/DL (ref 70–99)
HCG SERPL QL: NEGATIVE
HCO3 SERPL-SCNC: 26 MMOL/L (ref 22–29)
HCT VFR BLD AUTO: 37.2 % (ref 35–47)
HGB BLD-MCNC: 12.2 G/DL (ref 11.7–15.7)
HOLD SPECIMEN: NORMAL
IMM GRANULOCYTES # BLD: 0 10E3/UL
IMM GRANULOCYTES NFR BLD: 0 %
LYMPHOCYTES # BLD AUTO: 1.5 10E3/UL (ref 0.8–5.3)
LYMPHOCYTES NFR BLD AUTO: 14 %
MCH RBC QN AUTO: 31.4 PG (ref 26.5–33)
MCHC RBC AUTO-ENTMCNC: 32.8 G/DL (ref 31.5–36.5)
MCV RBC AUTO: 96 FL (ref 78–100)
MONOCYTES # BLD AUTO: 0.7 10E3/UL (ref 0–1.3)
MONOCYTES NFR BLD AUTO: 7 %
NEUTROPHILS # BLD AUTO: 8.2 10E3/UL (ref 1.6–8.3)
NEUTROPHILS NFR BLD AUTO: 77 %
NRBC # BLD AUTO: 0 10E3/UL
NRBC BLD AUTO-RTO: 0 /100
PLATELET # BLD AUTO: 314 10E3/UL (ref 150–450)
POTASSIUM SERPL-SCNC: 4.4 MMOL/L (ref 3.4–5.3)
PROT SERPL-MCNC: 6.5 G/DL (ref 6.4–8.3)
RBC # BLD AUTO: 3.88 10E6/UL (ref 3.8–5.2)
SODIUM SERPL-SCNC: 140 MMOL/L (ref 135–145)
WBC # BLD AUTO: 10.5 10E3/UL (ref 4–11)

## 2025-02-12 PROCEDURE — 84703 CHORIONIC GONADOTROPIN ASSAY: CPT | Performed by: EMERGENCY MEDICINE

## 2025-02-12 PROCEDURE — 76830 TRANSVAGINAL US NON-OB: CPT

## 2025-02-12 PROCEDURE — 93005 ELECTROCARDIOGRAM TRACING: CPT

## 2025-02-12 PROCEDURE — 85004 AUTOMATED DIFF WBC COUNT: CPT | Performed by: EMERGENCY MEDICINE

## 2025-02-12 PROCEDURE — 85025 COMPLETE CBC W/AUTO DIFF WBC: CPT | Performed by: EMERGENCY MEDICINE

## 2025-02-12 PROCEDURE — 80053 COMPREHEN METABOLIC PANEL: CPT | Performed by: EMERGENCY MEDICINE

## 2025-02-12 PROCEDURE — 99285 EMERGENCY DEPT VISIT HI MDM: CPT | Mod: 25

## 2025-02-12 PROCEDURE — 36415 COLL VENOUS BLD VENIPUNCTURE: CPT | Performed by: EMERGENCY MEDICINE

## 2025-02-12 PROCEDURE — 250N000011 HC RX IP 250 OP 636: Performed by: EMERGENCY MEDICINE

## 2025-02-12 PROCEDURE — 96374 THER/PROPH/DIAG INJ IV PUSH: CPT

## 2025-02-12 RX ORDER — KETOROLAC TROMETHAMINE 15 MG/ML
15 INJECTION, SOLUTION INTRAMUSCULAR; INTRAVENOUS ONCE
Status: COMPLETED | OUTPATIENT
Start: 2025-02-12 | End: 2025-02-12

## 2025-02-12 RX ADMIN — KETOROLAC TROMETHAMINE 15 MG: 15 INJECTION, SOLUTION INTRAMUSCULAR; INTRAVENOUS at 19:19

## 2025-02-12 ASSESSMENT — ACTIVITIES OF DAILY LIVING (ADL)
ADLS_ACUITY_SCORE: 41

## 2025-02-12 ASSESSMENT — COLUMBIA-SUICIDE SEVERITY RATING SCALE - C-SSRS
6. HAVE YOU EVER DONE ANYTHING, STARTED TO DO ANYTHING, OR PREPARED TO DO ANYTHING TO END YOUR LIFE?: YES
1. IN THE PAST MONTH, HAVE YOU WISHED YOU WERE DEAD OR WISHED YOU COULD GO TO SLEEP AND NOT WAKE UP?: NO
2. HAVE YOU ACTUALLY HAD ANY THOUGHTS OF KILLING YOURSELF IN THE PAST MONTH?: NO

## 2025-02-12 NOTE — ED NOTES
Bed: ED01  Expected date: 2/12/25  Expected time: 4:35 PM  Means of arrival: Ambulance  Comments:  BV1 40F abd/chest pain, vag bleed

## 2025-02-12 NOTE — ED PROVIDER NOTES
"  Emergency Department Note      History of Present Illness     Chief Complaint   Vaginal Bleeding      HPI   Ryanne Hill is a 40 year old female who presents for evaluation of 2 days of heavy vaginal bleeding.  Patient reports that she has a history of endometriosis and ovarian cysts.  She states that her menstrual cycle is irregular and she frequently has heavy bleeding with it.  She last had vaginal bleeding 2 weeks ago and states that she bled for 15 days.  She is not currently anticoagulated, though has a history of pulmonary embolism and was previously on Coumadin.  She notes cramping lower abdominal pain which is diffuse.  She reports passing clots today and using super tampons and bleeding around them.  Bleeding started heavy around 3 PM today.  She denies any lightheadedness dizziness or syncope.  No shortness of breath.  No chest pain.  She reports that she started an old oral birth control tablet (norethindrone) that she had leftover from [facility] and took 1 dose yesterday but none today.  She denies any other symptoms.     Independent Historian   None    Review of External Notes   None    Past Medical History     Medical History and Problem List   Past Medical History:   Diagnosis Date    Asthma     Cervical dysplasia March, 2007    Other and unspecified bipolar disorders     Pulmonary embolus (H) May, 2009       Medications   No current outpatient medications on file.      Surgical History   Past Surgical History:   Procedure Laterality Date    COLPOSCOPY,LOOP ELECTRD CERVIX EXCIS      HC DILATION/CURETTAGE DIAG/THER NON OB  4/09       Physical Exam     Patient Vitals for the past 24 hrs:   BP Pulse Resp SpO2 Height Weight   02/12/25 1640 (!) 135/97 69 22 100 % 1.721 m (5' 7.75\") 73.5 kg (162 lb)     Physical Exam  General: Well appearing, nontoxic. Resting comfortably  Head:  Scalp, face, and head appear normal  Eyes:  Pupils are equal, round    Conjunctivae non-injected and sclerae " white  ENT:    The external nose is normal    Pinnae are normal  Neck:  Normal range of motion    There is no rigidity noted    Trachea is in the midline  CV:  Regular rate and rhythm     Normal S1/S2, no S3/S4    No murmur or rub. Radial pulses 2+ bilaterally.  Resp:  Lungs are clear and equal bilaterally  There is no tachypnea    No increased work of breathing    No rales, wheezing, or rhonchi  GI:  Abdomen is soft, no rigidity or guarding    No distension, or mass    No tenderness or rebound tenderness   MS:  Normal muscular tone  Skin:  No rash or acute skin lesions noted  Neuro:  Awake and alert  Speech is normal and fluent  Moves all extremities spontaneously  Psych: Normal affect. Appropriate interactions.      Diagnostics     Lab Results   Labs Ordered and Resulted from Time of ED Arrival to Time of ED Departure   COMPREHENSIVE METABOLIC PANEL - Abnormal       Result Value    Sodium 140      Potassium 4.4      Carbon Dioxide (CO2) 26      Anion Gap 11      Urea Nitrogen 6.7      Creatinine 0.63      GFR Estimate >90      Calcium 8.7 (*)     Chloride 103      Glucose 84      Alkaline Phosphatase 94      AST 24      ALT 13      Protein Total 6.5      Albumin 4.0      Bilirubin Total 0.3     HCG QUALITATIVE PREGNANCY - Normal    hCG Serum Qualitative Negative     CBC WITH PLATELETS AND DIFFERENTIAL    WBC Count 10.5      RBC Count 3.88      Hemoglobin 12.2      Hematocrit 37.2      MCV 96      MCH 31.4      MCHC 32.8      RDW 14.3      Platelet Count 314      % Neutrophils 77      % Lymphocytes 14      % Monocytes 7      % Eosinophils 1      % Basophils 1      % Immature Granulocytes 0      NRBCs per 100 WBC 0      Absolute Neutrophils 8.2      Absolute Lymphocytes 1.5      Absolute Monocytes 0.7      Absolute Eosinophils 0.1      Absolute Basophils 0.1      Absolute Immature Granulocytes 0.0      Absolute NRBCs 0.0         Imaging   US Pelvis Cmplt w Transvag & Doppler LmtPel Duplex Limited   Final Result    IMPRESSION:     1.  Mildly thickened endometrium, presumed physiologic. No evidence for significant fluid or hemorrhage in the endometrial canal.   2.  No evidence for ovarian torsion. No findings to account for the patient's pelvic pain.                 Independent Interpretation   None    ED Course      Medications Administered   Medications   ketorolac (TORADOL) injection 15 mg (has no administration in time range)       Procedures   Procedures     Discussion of Management   None    ED Course   ED Course as of 02/12/25 1904   Wed Feb 12, 2025   1649 Patient seen and evaluated    1857 Patient updated regarding findings and plan of care.        Additional Documentation  None    Medical Decision Making / Diagnosis     CMS Diagnoses: None    MIPS       None    MDM   Ryanne Hill is a 40 year old female who presents to the emergency department for evaluation of heavy vaginal bleeding.  On my evaluation she is well-appearing, hemodynamically stable and afebrile.  Abdominal exam is benign.  ED evaluation is ultimately consistent with dysfunctional uterine bleeding.  Pelvic ultrasound shows mildly thickened endometrium without any other acute abnormality.  No evidence of ovarian torsion, ovarian cysts, uterine fibroids, intra-abdominal free fluid or any other acute abnormality.  hCG is negative.  CBC and CMP are normal.  No evidence of severe hemorrhage.  Patient started taking norethindrone yesterday and I recommended she continue taking this until she follows up with her gynecologist.  Referral was placed for gynecology follow-up.  I recommended supportive care at home with Tylenol, ibuprofen.  No indication for hospitalization or emergent gynecologic consultation at this time.  Patient agreeable to plan of care and she was discharged in stable condition.    Disposition   The patient was discharged.     Diagnosis     ICD-10-CM    1. Dysfunctional uterine bleeding  N93.8 Ob/Gyn  Referral           Discharge  Medications   New Prescriptions    No medications on file         MD Nolan Mansfield, Casimiro Granda MD  02/12/25 1925

## 2025-02-13 LAB
ATRIAL RATE - MUSE: 70 BPM
DIASTOLIC BLOOD PRESSURE - MUSE: NORMAL MMHG
INTERPRETATION ECG - MUSE: NORMAL
P AXIS - MUSE: 64 DEGREES
PR INTERVAL - MUSE: 142 MS
QRS DURATION - MUSE: 96 MS
QT - MUSE: 454 MS
QTC - MUSE: 490 MS
R AXIS - MUSE: 72 DEGREES
SYSTOLIC BLOOD PRESSURE - MUSE: NORMAL MMHG
T AXIS - MUSE: 56 DEGREES
VENTRICULAR RATE- MUSE: 70 BPM

## 2025-03-25 NOTE — PROGRESS NOTES
SUBJECTIVE:         I spent a total of 15 minutes on care of Ryanne on the day of service including attendance of face-to-face time with remainder in chart review, care coordination, documentation on the day of service.                                              Ryanne Hill is a 40 year old female who presents to clinic today for the following health issue(s): dysfunctional uterine bleeding   No chief complaint on file.    Ryanne has been recently to the emergency room here at [facility].  She has history of menorrhagia and and was recommended to have a follow-up.  She is not able to pay for medications that she has no funds and is living homeless.  Was recommended to at least start with progesterone.  She notes that she does have a history of pulmonary embolism in which case would be ill advised to start on estrogen plus she smokes.    She had an ultrasound that demonstrates an 8 cm uterus with a 9 mm endometrial stripe.  There are no masses.    She is consulted to have endometrial sample and they will follow-up with that after that as to where to go.    She has been told in the past 7 years ago that she had endometriosis but there was no follow-up with that.  Endometriosis was discovered with laparoscopy for pelvic pain.  She also had her tubes tied 7 years ago after the birth of her fourth child.    Examination:  She is pleasant appropriate no apparent distress  External genitalia are normal  Vaginal mucosa is normal there is a yellowish watery odorous discharge  And she is agreeable to having STD check.  Wet prep and GC GC chlamydia is pending    She is also consented for risks and benefits and rationale for having an endometrial biopsy.  Cervix is multiparous  Endometrial sample was done with the endometrial [place]  The past 7 and half centimeters through the cervical canal into the endometrial cavity  Moderate amount of tissue recovered  Minimal amount of bleeding encountered  Patient tolerated  procedure well and without much cramping.  She has been offered ibuprofen and declines.    Pap smear was also done.    Assessment:  Patient with history of menorrhagia and endometriosis with a chronic history of pelvic pain and poor social situation.  She is applied for medical health care at this point  She is inclined to have a hysterectomy and has evidently unknown history of endometriosis and were looking for records from [place] where she had that procedure.    Plan:  Offered her progesterone but she states she would feel better for a fourth regardless  Suggest she follow-up in 2 weeks to discuss her labs she does to MyChart though.      No LMP recorded..     Patient is not sexually active, .  Using tubal ligation for contraception.    reports that she has been smoking cigarettes. She does not have any smokeless tobacco history on file.    STD testing offered?  Declined    Health maintenance updated:  no    Today's PHQ-2 Score:        No data to display              Today's PHQ-9 Score:       2022     9:40 AM   PHQ-9 SCORE   PHQ-9 Total Score MyChart 23 (Severe depression)   PHQ-9 Total Score 23     Today's MERT-7 Score:       2022     9:52 AM   MERT-7 SCORE   Total Score 21 (severe anxiety)   Total Score 21       Problem list and histories reviewed & adjusted, as indicated.  Additional history: as documented.    Patient Active Problem List   Diagnosis    Bipolar affective disorder (H)    Pulmonary embolism and infarction (H)    Intermittent asthma    CARDIOVASCULAR SCREENING; LDL GOAL LESS THAN 160    Hyperhomocystinemia    Pulmonary embolism (H)     Past Surgical History:   Procedure Laterality Date    COLPOSCOPY,LOOP ELECTRD CERVIX EXCIS      HC DILATION/CURETTAGE DIAG/THER NON OB        Social History     Tobacco Use    Smoking status: Every Day     Current packs/day: 0.50     Types: Cigarettes    Smokeless tobacco: Not on file   Substance Use Topics    Alcohol use: Yes     Alcohol/week: 1.7  standard drinks of alcohol     Types: 2 drink(s) per week      No data available              No current outpatient medications on file.     No current facility-administered medications for this visit.     Allergies   Allergen Reactions    Compazine              OBJECTIVE:     There were no vitals taken for this visit.  There is no height or weight on file to calculate BMI.    Exam:  See above    In-Clinic Test Results:  No results found for this or any previous visit (from the past 24 hours).    ASSESSMENT/PLAN:                                                          Nitish Ervin MD  [facility]

## 2025-03-27 ENCOUNTER — OFFICE VISIT (OUTPATIENT)
Dept: OBGYN | Facility: CLINIC | Age: 40
End: 2025-03-27

## 2025-03-27 VITALS
WEIGHT: 160 LBS | HEIGHT: 67 IN | SYSTOLIC BLOOD PRESSURE: 130 MMHG | BODY MASS INDEX: 25.11 KG/M2 | DIASTOLIC BLOOD PRESSURE: 80 MMHG

## 2025-03-27 DIAGNOSIS — Z59.00 HOMELESS: ICD-10-CM

## 2025-03-27 DIAGNOSIS — N80.9 ENDOMETRIOSIS: ICD-10-CM

## 2025-03-27 DIAGNOSIS — Z01.812 PRE-PROCEDURE LAB EXAM: Primary | ICD-10-CM

## 2025-03-27 DIAGNOSIS — N93.8 DYSFUNCTIONAL UTERINE BLEEDING: ICD-10-CM

## 2025-03-27 DIAGNOSIS — Z12.4 SCREENING FOR MALIGNANT NEOPLASM OF CERVIX: ICD-10-CM

## 2025-03-27 LAB
CLUE CELLS: PRESENT
TRICHOMONAS, WET PREP: ABNORMAL
WBC'S/HIGH POWER FIELD, WET PREP: ABNORMAL
YEAST, WET PREP: ABNORMAL

## 2025-03-27 SDOH — ECONOMIC STABILITY - HOUSING INSECURITY: HOMELESSNESS UNSPECIFIED: Z59.00

## 2025-03-27 NOTE — NURSING NOTE
"No chief complaint on file.      Initial /80   Ht 1.702 m (5' 7\")   Wt 72.6 kg (160 lb)   LMP 2025   BMI 25.06 kg/m   Estimated body mass index is 25.06 kg/m  as calculated from the following:    Height as of this encounter: 1.702 m (5' 7\").    Weight as of this encounter: 72.6 kg (160 lb).  BP completed using cuff size: regular    Questioned patient about current smoking habits.  Pt. has never smoked.          The following HM Due: NONE    Sarah Beth Vigil LPN on 3/27/2025 at 1:43 PM    "

## 2025-03-31 ENCOUNTER — PATIENT OUTREACH (OUTPATIENT)
Dept: CARE COORDINATION | Facility: CLINIC | Age: 40
End: 2025-03-31

## 2025-03-31 NOTE — PROGRESS NOTES
FRW UPDATE :  3/31/25 -  box full. Outreach attempted x 1 was unable to reach. Left message on voicemail with call back information and requested return call.  Plan: Current outreach date reflects FRW 's follow up within one week.

## 2025-04-01 LAB
PATH REPORT.COMMENTS IMP SPEC: NORMAL
PATH REPORT.COMMENTS IMP SPEC: NORMAL
PATH REPORT.FINAL DX SPEC: NORMAL
PATH REPORT.GROSS SPEC: NORMAL
PATH REPORT.MICROSCOPIC SPEC OTHER STN: NORMAL
PATH REPORT.RELEVANT HX SPEC: NORMAL
PHOTO IMAGE: NORMAL

## 2025-04-02 ENCOUNTER — TELEPHONE (OUTPATIENT)
Dept: OBGYN | Facility: CLINIC | Age: 40
End: 2025-04-02

## 2025-04-02 LAB
BKR AP ASSOCIATED HPV REPORT: ABNORMAL
BKR LAB AP GYN ADEQUACY: ABNORMAL
BKR LAB AP GYN INTERPRETATION: ABNORMAL
BKR LAB AP LMP: ABNORMAL
BKR LAB AP PREVIOUS ABNORMAL: ABNORMAL
PATH REPORT.COMMENTS IMP SPEC: ABNORMAL
PATH REPORT.COMMENTS IMP SPEC: ABNORMAL
PATH REPORT.RELEVANT HX SPEC: ABNORMAL

## 2025-04-02 NOTE — TELEPHONE ENCOUNTER
[facility] calling regarding the cleocin vag cream that was rx'd for BV.  .   Pt does not have insurance and it is quite expensive.    Pt is requesting an alternative.    The pharmacy checked the cost of other treatment options and it looks like oral Flagyl 500 mg BID x 7 days is the cheapest option.    Ok to send this in?    Ivy PUCKETT RN  [facility]

## 2025-04-07 ENCOUNTER — PATIENT OUTREACH (OUTPATIENT)
Dept: CARE COORDINATION | Facility: CLINIC | Age: 40
End: 2025-04-07

## 2025-04-07 NOTE — PROGRESS NOTES
FRW UPDATE :  4/7/25 - Patient not interested in services. No further assistance needed from [facility].   Plan: FRW closed the FRW program and remove patient from panel. Patient can be referred back to [facility] if needed.

## 2025-04-23 ENCOUNTER — PATIENT OUTREACH (OUTPATIENT)
Dept: CARE COORDINATION | Facility: CLINIC | Age: 40
End: 2025-04-23

## 2025-04-23 NOTE — PROGRESS NOTES
Clinic Care Coordination Contact    Situation: Patient chart reviewed by care coordinator.    Background: Clinic Care Coordination Referral placed by OBGYN/Speciality Care Provider. This writer completed initial outreach to pt on 03/28/25. At that time, pt was not established with a PCP but had a visit scheduled on 04/22/25 to establish care. Per pt report, pt reported working with [employer] on obtaining insurance and pt anticipated getting insurance card in the mail soon. FRW referral entered to help with other [facility] benefits and jony care. Resources were sent to pt to explore. KERRIE ESCOBAR made a plan to follow-up with pt after establishing with PCP to complete enrollment.     Assessment: Per chart review today, pt did not attend her apt to establish with a PCP. FRW referral closed because pt declined.At this time, patient is not a candidate for Primary Care Clinic Care Coordination enrollment because the pt is not established with a PCP at [facility].     Plan/Recommendations: This writer sent pt a [facility] message with contact information and encouraging the pt to call and reschedule her PCP apt and follow-up on the adult mental health referral entered by OBGYN Dr Ervin. CC referral/order cancelled. KERRIE ESCOBAR will perform no further monitoring/outreaches at this time and will remain available as needed. When established with a PCP and if/when new needs arise, a new Care Coordination Referral may be placed.    ROQUE Jeffries  [facility]   Primary Care Social Work Care Coordinator  [facility] [place]   Phone: [phone]